# Patient Record
Sex: FEMALE | Race: WHITE | Employment: UNEMPLOYED | URBAN - METROPOLITAN AREA
[De-identification: names, ages, dates, MRNs, and addresses within clinical notes are randomized per-mention and may not be internally consistent; named-entity substitution may affect disease eponyms.]

---

## 2017-01-11 ENCOUNTER — HOSPITAL ENCOUNTER (OUTPATIENT)
Dept: MAMMOGRAPHY | Age: 47
Discharge: HOME | End: 2017-01-11
Attending: PHYSICIAN ASSISTANT
Payer: COMMERCIAL

## 2017-01-11 DIAGNOSIS — Z12.31 ENCOUNTER FOR MAMMOGRAM TO ESTABLISH BASELINE MAMMOGRAM: ICD-10-CM

## 2017-01-11 PROCEDURE — 77067 SCR MAMMO BI INCL CAD: CPT

## 2018-01-24 ENCOUNTER — HOSPITAL ENCOUNTER (OUTPATIENT)
Dept: MAMMOGRAPHY | Age: 48
Discharge: HOME | End: 2018-01-24
Attending: FAMILY MEDICINE
Payer: COMMERCIAL

## 2018-01-24 DIAGNOSIS — Z12.31 SCREENING MAMMOGRAM, ENCOUNTER FOR: ICD-10-CM

## 2018-01-24 PROCEDURE — 77067 SCR MAMMO BI INCL CAD: CPT | Performed by: FAMILY MEDICINE

## 2018-10-02 ENCOUNTER — OFFICE VISIT (OUTPATIENT)
Dept: SURGERY | Facility: CLINIC | Age: 48
End: 2018-10-02
Payer: COMMERCIAL

## 2018-10-02 VITALS
TEMPERATURE: 99 F | HEIGHT: 67 IN | WEIGHT: 122 LBS | BODY MASS INDEX: 19.15 KG/M2 | DIASTOLIC BLOOD PRESSURE: 64 MMHG | HEART RATE: 72 BPM | SYSTOLIC BLOOD PRESSURE: 104 MMHG | RESPIRATION RATE: 18 BRPM

## 2018-10-02 DIAGNOSIS — K64.5 THROMBOSED EXTERNAL HEMORRHOID: Primary | ICD-10-CM

## 2018-10-02 PROCEDURE — 99213 OFFICE O/P EST LOW 20 MIN: CPT | Performed by: COLON & RECTAL SURGERY

## 2018-10-02 PROCEDURE — 46320 REMOVAL OF HEMORRHOID CLOT: CPT | Performed by: COLON & RECTAL SURGERY

## 2018-10-02 NOTE — PATIENT INSTRUCTIONS
Assessment   Thrombosed external hemorrhoid  (primary encounter diagnosis)      Plan   Patient had a large right lateral thrombosed external hemorrhoid.   This was evacuated and the associated hemorrhoid tissue excised at the bedside under local anestheti

## 2018-10-02 NOTE — H&P
New Patient Visit Note       Active Problems      1. Thrombosed external hemorrhoid        Chief Complaint   Painful lump on the anus    History of Present Illness   Shweta Sapp is a 50year old female who presents for evaluation of anal pain.   Her PC SCOPE,MED/LAT MENISECTOMY; Left      Comment:  Procedure: KNEE ARTHROSCOPY;  Surgeon: Arjun Trujillo;   Location: [facility]  1997: LASER SURGERY OF CERVIX      Comment:  Abnormal cell on cervix  10/2015: MAU NEEDLE LOCALIZATION W/ S [id number] UNIT/GM External Ointment APPLY A SUFFICIENT AMOUNT TO AFFECTED AREA AS DIRECTED Disp:  Rfl: 1         Review of Systems  The Review of Systems has been reviewed by me during today.   Review of Systems   Constitutional: Negative for activity change, rash noted. She is not diaphoretic. Psychiatric: She has a normal mood and affect. Her behavior is normal.      The perineum and perianal skin were examined. There was not perianal excoriation. There was enlarged external hemorrhoid tissue.   There wa to take warm water sitz baths 3-4 times daily. She can also sit on a heat pack or 7 ice pack if these help her symptoms. Patient should take Tylenol for pain control, and after 48 hours she can add ibuprofen or naproxen.     Patient should notify the of

## 2018-10-16 ENCOUNTER — OFFICE VISIT (OUTPATIENT)
Dept: SURGERY | Facility: CLINIC | Age: 48
End: 2018-10-16
Payer: COMMERCIAL

## 2018-10-16 VITALS
HEART RATE: 71 BPM | SYSTOLIC BLOOD PRESSURE: 103 MMHG | BODY MASS INDEX: 18.83 KG/M2 | DIASTOLIC BLOOD PRESSURE: 71 MMHG | HEIGHT: 67 IN | WEIGHT: 120 LBS | TEMPERATURE: 98 F

## 2018-10-16 DIAGNOSIS — K64.8 INTERNAL AND EXTERNAL HEMORRHOIDS WITHOUT COMPLICATION: ICD-10-CM

## 2018-10-16 DIAGNOSIS — K64.5 THROMBOSED EXTERNAL HEMORRHOID: Primary | ICD-10-CM

## 2018-10-16 DIAGNOSIS — K64.4 INTERNAL AND EXTERNAL HEMORRHOIDS WITHOUT COMPLICATION: ICD-10-CM

## 2018-10-16 PROCEDURE — 46600 DIAGNOSTIC ANOSCOPY SPX: CPT | Performed by: COLON & RECTAL SURGERY

## 2018-10-16 PROCEDURE — 99213 OFFICE O/P EST LOW 20 MIN: CPT | Performed by: COLON & RECTAL SURGERY

## 2018-10-16 NOTE — PROGRESS NOTES
Follow Up Visit Note       Active Problems      No diagnosis found.       Chief Complaint   Patient presents with:  Hemorrhoids: est pt, 2 wk fu excision  of thrombosed hemorrhoid 10/2        History of Present Illness        Allergies  aTngela Yun is allergic to Sexual Activity      Alcohol use: No      Drug use: Yes        Types: Cannabis        Comment: 3-4x per week   last use 6/1/15      Sexual activity: Not on file    Other Topics      Concerns:        Not on file    Social History Narrative      ** Merged Hi No diagnosis found. Plan            No orders of the defined types were placed in this encounter. Imaging & Referrals   None    Follow Up  No Follow-up on file.     Serafin Pantoja MD

## 2018-10-16 NOTE — PROGRESS NOTES
Follow Up Visit Note       Active Problems      1. Thrombosed external hemorrhoid    2.  Internal and external hemorrhoids without complication          Chief Complaint   Anal pain      History of Present Illness  Farida Whitman is a 50year old female wh [facility] MAIN OR   • CORRECT BUNION,SIMPLE Right    • KNEE ARTHROSCOPY  3/2016   • KNEE ARTHROSCOPY Left 3/23/2016    Performed by Gifty Wallace at [id number] [facility] SCOPE,MED/LAT MENISECTOMY Left 3/23/2016    Procedure: KNEE ARTHROSCOPY;  Surgeon: RAJESH External Ointment APPLY A SUFFICIENT AMOUNT TO AFFECTED AREA AS DIRECTED Disp:  Rfl: 1   Cyanocobalamin (VITAMIN B 12 OR) Take  by mouth. Disp:  Rfl:    Cholecalciferol (VITAMIN D) 1000 UNITS Oral Cap Take  by mouth.  Disp:  Rfl:         Review of Systems range of motion. She exhibits no edema. Lymphadenopathy:     She has no cervical adenopathy. Neurological: She is alert and oriented to person, place, and time. Skin: Skin is warm and dry. No rash noted. She is not diaphoretic.    Psychiatric: She has this time. Patient should avoid using suppositories. She should avoid using any topical agents containing hydrocortisone or other steroids. If the patient is obtaining relief with topical lidocaine, this can be continued.     Patient should continue hi

## 2018-10-17 NOTE — PATIENT INSTRUCTIONS
Assessment   Thrombosed external hemorrhoid  (primary encounter diagnosis)  Internal and external hemorrhoids without complication    Plan   Patient is doing well 2 weeks after excision of thrombosed external hemorrhoid.     There is no recurrence of thromb

## 2018-11-06 ENCOUNTER — OFFICE VISIT (OUTPATIENT)
Dept: SURGERY | Facility: CLINIC | Age: 48
End: 2018-11-06
Payer: COMMERCIAL

## 2018-11-06 VITALS
WEIGHT: 120 LBS | HEART RATE: 74 BPM | HEIGHT: 67 IN | RESPIRATION RATE: 16 BRPM | BODY MASS INDEX: 18.83 KG/M2 | SYSTOLIC BLOOD PRESSURE: 111 MMHG | DIASTOLIC BLOOD PRESSURE: 73 MMHG | TEMPERATURE: 99 F

## 2018-11-06 DIAGNOSIS — Z09 FOLLOW-UP EXAMINATION: Primary | ICD-10-CM

## 2018-11-06 PROCEDURE — 99024 POSTOP FOLLOW-UP VISIT: CPT | Performed by: COLON & RECTAL SURGERY

## 2018-11-06 PROCEDURE — 46600 DIAGNOSTIC ANOSCOPY SPX: CPT | Performed by: COLON & RECTAL SURGERY

## 2018-11-06 RX ORDER — METRONIDAZOLE 250 MG/1
250 TABLET ORAL 3 TIMES DAILY
Qty: 21 TABLET | Refills: 0 | Status: SHIPPED | OUTPATIENT
Start: 2018-11-06 | End: 2018-11-13

## 2018-11-06 RX ORDER — CIPROFLOXACIN 500 MG/1
500 TABLET, FILM COATED ORAL 2 TIMES DAILY
Qty: 14 TABLET | Refills: 0 | Status: SHIPPED | OUTPATIENT
Start: 2018-11-06 | End: 2018-11-13

## 2018-11-06 NOTE — PROGRESS NOTES
Follow Up Visit Note       Active Problems      1.  Follow-up examination          Chief Complaint   Anal pain and bleeding      History of Present Illness  Rima Agarwal is a 50year old female who underwent excision of thrombosed hemorrhoid on 10/2/201 Problem Relation Age of Onset   • Cancer Mother         Lung cancer   • Lipids Father      Social History    Socioeconomic History      Marital status:       Spouse name: Not on file      Number of children: Not on file      Years of education: No Constitutional: Negative for chills, diaphoresis, fatigue, fever and unexpected weight change. HENT: Negative for hearing loss, nosebleeds, rhinorrhea, sore throat and trouble swallowing. Eyes: Negative for visual disturbance.    Respiratory: Negativ She is alert and oriented to person, place, and time. Skin: Skin is warm and dry. No rash noted. She is not diaphoretic. Psychiatric: She has a normal mood and affect. Her behavior is normal.     The perineum and perianal skin were examined.    There wa buildup circumferentially right at the anal rectal ring. There is no active bleeding in this area. There are no other visualized thromboses. Overall the patient is improving.   I will order a topical ointment of nifedipine, metronidazole, lidocaine, an

## 2018-11-07 NOTE — PATIENT INSTRUCTIONS
Assessment   Follow-up examination  (primary encounter diagnosis)    Plan   Patient has ongoing pain and intermittent bleeding 5 weeks status post excision of thrombosed right lateral external hemorrhoid. There is no associated fever or chills.   On examin

## 2018-11-20 ENCOUNTER — OFFICE VISIT (OUTPATIENT)
Dept: SURGERY | Facility: CLINIC | Age: 48
End: 2018-11-20
Payer: COMMERCIAL

## 2018-11-20 VITALS
DIASTOLIC BLOOD PRESSURE: 69 MMHG | HEART RATE: 78 BPM | SYSTOLIC BLOOD PRESSURE: 105 MMHG | BODY MASS INDEX: 19.15 KG/M2 | HEIGHT: 67 IN | WEIGHT: 122 LBS | TEMPERATURE: 99 F

## 2018-11-20 DIAGNOSIS — K62.89 ANAL PAIN: Primary | ICD-10-CM

## 2018-11-20 PROCEDURE — 99212 OFFICE O/P EST SF 10 MIN: CPT | Performed by: COLON & RECTAL SURGERY

## 2018-11-20 NOTE — PROGRESS NOTES
Follow Up Visit Note       Active Problems      1.  Anal pain          Chief Complaint   Rare bleeding      History of Present Illness  Holli Cornell is a 50year old female who underwent excision of thrombosed right sided external hemorrhoid on 10/16/20 status:       Spouse name: Not on file      Number of children: Not on file      Years of education: Not on file      Highest education level: Not on file    Tobacco Use      Smoking status: Former Smoker      Smokeless tobacco: Never Used    Substa wound.   Neurological: Negative for dizziness, tremors, syncope, weakness and headaches. Hematological: Negative for adenopathy. Does not bruise/bleed easily.    Psychiatric/Behavioral: Negative for agitation, behavioral problems, sleep disturbance and sosa hemorrhoid. Patient's pain has resolved. The post procedure bleeding is now only rare. She is having formed bowel movements without difficulty. There is still a faint area of healing hemorrhoid ectomy site. There is no evidence of infection.     Mansi

## 2018-11-20 NOTE — PATIENT INSTRUCTIONS
Assessment   Anal pain  (primary encounter diagnosis)    Plan   Patient is doing well 7 weeks after excision of thrombosed right external hemorrhoid. Patient's pain has resolved. The post procedure bleeding is now only rare.   She is having formed bowel

## 2019-05-08 ENCOUNTER — HOSPITAL ENCOUNTER (OUTPATIENT)
Dept: MAMMOGRAPHY | Age: 49
Discharge: HOME | End: 2019-05-08
Attending: FAMILY MEDICINE
Payer: COMMERCIAL

## 2019-05-08 DIAGNOSIS — Z12.31 SCREENING MAMMOGRAM, ENCOUNTER FOR: ICD-10-CM

## 2019-05-08 PROCEDURE — 77067 SCR MAMMO BI INCL CAD: CPT | Performed by: FAMILY MEDICINE

## 2019-05-08 PROCEDURE — 77063 BREAST TOMOSYNTHESIS BI: CPT | Performed by: FAMILY MEDICINE

## 2020-08-17 ENCOUNTER — HOSPITAL ENCOUNTER (OUTPATIENT)
Dept: MAMMOGRAPHY | Age: 50
End: 2020-08-17
Payer: COMMERCIAL

## 2020-08-17 ENCOUNTER — HOSPITAL ENCOUNTER (OUTPATIENT)
Dept: MAMMOGRAPHY | Age: 50
Discharge: HOME | End: 2020-08-17
Attending: PHYSICIAN ASSISTANT
Payer: COMMERCIAL

## 2020-08-17 DIAGNOSIS — Z12.31 ENCOUNTER FOR SCREENING MAMMOGRAM FOR MALIGNANT NEOPLASM OF BREAST: ICD-10-CM

## 2020-08-17 PROCEDURE — 77063 BREAST TOMOSYNTHESIS BI: CPT | Performed by: PHYSICIAN ASSISTANT

## 2020-08-17 PROCEDURE — 77067 SCR MAMMO BI INCL CAD: CPT | Performed by: PHYSICIAN ASSISTANT

## 2021-09-11 ENCOUNTER — HOSPITAL ENCOUNTER (OUTPATIENT)
Dept: MAMMOGRAPHY | Facility: HOSPITAL | Age: 51
Discharge: HOME | End: 2021-09-11
Attending: PHYSICIAN ASSISTANT
Payer: COMMERCIAL

## 2021-09-11 DIAGNOSIS — Z12.31 ENCOUNTER FOR SCREENING MAMMOGRAM FOR MALIGNANT NEOPLASM OF BREAST: ICD-10-CM

## 2021-09-11 PROCEDURE — 77063 BREAST TOMOSYNTHESIS BI: CPT | Performed by: PHYSICIAN ASSISTANT

## 2021-09-11 PROCEDURE — 77067 SCR MAMMO BI INCL CAD: CPT | Performed by: PHYSICIAN ASSISTANT

## (undated) NOTE — LETTER
10/17/18    Dear Joanna Serrano PA-C,    I am seeing Johnny Leblanc in the office today after excision of thrombosed external hemorrhoid on 10/2/2018.   Her conversation is looks to be or or he can he can have her go him a cell phone when she is

## (undated) NOTE — LETTER
10/02/18    Dear Feliciano Urrutia PA-C,    I am seeing Norma Hernández in the office today for a thrombosed external hemorrhoid per         Assessment   Thrombosed external hemorrhoid  (primary encounter diagnosis)      Planbody   Patient had a large right

## (undated) NOTE — LETTER
11/20/18    Dear Quique Rico PA-C,    I am seeing Akosua Perez in the office today after excision of thrombosed external hemorrhoid on 10/16/2018.               Assessment   Anal pain  (primary encounter diagnosis)    Planbody   Patient is doing wel